# Patient Record
Sex: FEMALE | Race: WHITE | Employment: OTHER | URBAN - METROPOLITAN AREA
[De-identification: names, ages, dates, MRNs, and addresses within clinical notes are randomized per-mention and may not be internally consistent; named-entity substitution may affect disease eponyms.]

---

## 2017-03-19 ENCOUNTER — HOSPITAL ENCOUNTER (EMERGENCY)
Age: 67
Discharge: HOME | End: 2017-03-19
Attending: EMERGENCY MEDICINE
Payer: MEDICARE

## 2017-03-19 VITALS
HEART RATE: 92 BPM | DIASTOLIC BLOOD PRESSURE: 88 MMHG | SYSTOLIC BLOOD PRESSURE: 157 MMHG | WEIGHT: 130 LBS | BODY MASS INDEX: 23.04 KG/M2 | RESPIRATION RATE: 18 BRPM | TEMPERATURE: 97.7 F | OXYGEN SATURATION: 99 % | HEIGHT: 63 IN

## 2017-03-19 DIAGNOSIS — L30.9 ECZEMA, UNSPECIFIED TYPE: Primary | ICD-10-CM

## 2017-03-19 PROCEDURE — 99282 EMERGENCY DEPT VISIT SF MDM: CPT

## 2017-03-19 RX ORDER — TRIAMCINOLONE ACETONIDE 1 MG/G
OINTMENT TOPICAL 2 TIMES DAILY
Qty: 30 G | Refills: 0 | Status: SHIPPED | OUTPATIENT
Start: 2017-03-19

## 2017-03-19 NOTE — ED PROVIDER NOTES
HPI Comments: 77 y.o. female with past medical history significant for mucinous ovarian borderline tumor, PAP, hypercholesterolemia, HTN, and TIA who presents from home with chief complaint of rash. Pt reports to the ED c/o a rash on the posterior aspect of her R-leg that is approximately at the level of her knee, noting that the rash had \"bubbles\" in it yesterday. Pt states that she was bitten by a tick in the area on ~03/02/2017, which she removed with alcohol, but the area began to itch with the appearance of \"lumps. \" Pt went to Urgent Care on 03/09/2017, at which time she was given 2 doses of Doxycycline and told to return if her symptoms worsened. Pt reports that the rash became more red with some pus in the rash this past week, so she went back to Urgent Care 3 days ago, where she was put on a 10-day course of 200 mg Doxycycline. Pt received a call from Urgent Care yesterday telling her that the test for [facility] spotted fever returned positive, but that the course of abx would be sufficient treatment. Pt came in today for a second opinion of the course of treatment. Pt notes that she has Hx of dermatitis, and she treated her rash with Hydrocortisone and Benadryl. Pt denies experiencing any abdominal pain or fever. There are no other acute medical concerns at this time. PCP: Jaime Escobar MD    Note written by Liliana Parham, as dictated by José Miguel Verduzco MD 11:51 PM         Past Medical History:   Diagnosis Date    Cancer ([facility] [address] 8/23/07    mucinous ovarian borderline tumor    H/O mammogram 8/19/11    Hypercholesterolemia     Hypertension     PAP (pulmonary alveolar proteinosis) ([address] 2/9/12    negative    Stroke ([address] 3/9/15    TIA       Past Surgical History:   Procedure Laterality Date    ENDOSCOPY, COLON, DIAGNOSTIC  2000    ENDOSCOPY, COLON, DIAGNOSTIC  2009    normal    HX APPENDECTOMY      HX GYN  8/8/07    hysteroscopy/D&C    HX GYN  8/23/07    ANGELY/BSO/peritoneal bx of the pelvis and omenectomy         Family History:   Problem Relation Age of Onset    Cancer Father     Heart Disease Mother        Social History     Social History    Marital status:      Spouse name: N/A    Number of children: N/A    Years of education: N/A     Occupational History    Not on file. Social History Main Topics    Smoking status: Current Every Day Smoker     Packs/day: 0.50    Smokeless tobacco: Never Used    Alcohol use 1.5 oz/week     3 Glasses of wine per week      Comment: daily    Drug use: No    Sexual activity: Not Currently     Other Topics Concern    Not on file     Social History Narrative         ALLERGIES: Ampicillin    Review of Systems   Constitutional: Negative for fever. Gastrointestinal: Negative for abdominal pain. Skin: Positive for rash. All other systems reviewed and are negative. Vitals:    03/19/17 1144   BP: (!) 207/98   Pulse: (!) 115   Resp: 18   Temp: 97.7 °F (36.5 °C)   SpO2: 99%   Weight: 59 kg (130 lb)   Height: 5' 3\" (1.6 m)            Physical Exam   Constitutional: She appears well-developed and well-nourished. No distress. HENT:   Head: Normocephalic. Eyes: Pupils are equal, round, and reactive to light. Neck: Normal range of motion. Cardiovascular: Normal rate and regular rhythm. No murmur heard. Pulmonary/Chest: Effort normal and breath sounds normal. No respiratory distress. Abdominal: Soft. There is no tenderness. Musculoskeletal: Normal range of motion. She exhibits no edema. Neurological: She is alert. She has normal strength. No cranial nerve deficit. Skin: Skin is warm and dry. Rash noted. Eczematoid-type rash over posterior R-leg approximately at knee level. Not petechial. No involvement of palms of hands, soles of feet, or any other region of the body. Psychiatric: She has a normal mood and affect. Her behavior is normal.   Nursing note and vitals reviewed. Note written by Archie Brewster scribe, as dictated by Brian Baird.  Kim Trejo MD 11:51 PM      MDM  ED Course       Procedures

## 2017-03-19 NOTE — ED NOTES
Patient quite anxious during triage. Takes medication for high blood pressure, reports its \"always high\" at the doctor's office.

## 2017-03-19 NOTE — ED TRIAGE NOTES
Patient reports she removed a tick from the back of her right leg on 3/3. Was seen at urgent care several days later and was given 2 doses of doxycycline. A week later she developed a skin infection at the site (last Thursday) and was tested for tick borne illness. She was given doxycycline again and told to follow up. Patient would like a second opinion as to if she is to \"just take the medicine and go home\".

## 2017-03-19 NOTE — DISCHARGE INSTRUCTIONS
